# Patient Record
Sex: FEMALE | Race: WHITE | Employment: OTHER | URBAN - METROPOLITAN AREA
[De-identification: names, ages, dates, MRNs, and addresses within clinical notes are randomized per-mention and may not be internally consistent; named-entity substitution may affect disease eponyms.]

---

## 2018-07-24 ENCOUNTER — APPOINTMENT (OUTPATIENT)
Dept: GENERAL RADIOLOGY | Age: 38
End: 2018-07-24
Payer: COMMERCIAL

## 2018-07-24 ENCOUNTER — HOSPITAL ENCOUNTER (EMERGENCY)
Age: 38
Discharge: HOME | End: 2018-07-24
Attending: EMERGENCY MEDICINE
Payer: COMMERCIAL

## 2018-07-24 DIAGNOSIS — R60.9 DEPENDENT EDEMA: ICD-10-CM

## 2018-07-24 DIAGNOSIS — R05.9 COUGH: Primary | ICD-10-CM

## 2018-07-24 LAB
ANION GAP SERPL CALCULATED.3IONS-SCNC: 19 MMOL/L (ref 3–16)
B-TYPE NATRIURETIC PEPTIDE: <15 PG/ML (ref 0–99.9)
BASOPHILS ABSOLUTE: 0.1 K/UL (ref 0–0.2)
BASOPHILS RELATIVE PERCENT: 0.9 %
BUN BLDV-MCNC: 10 MG/DL (ref 7–20)
CALCIUM SERPL-MCNC: 9.3 MG/DL (ref 8.3–10.6)
CHLORIDE BLD-SCNC: 102 MMOL/L (ref 99–110)
CO2: 18 MMOL/L (ref 21–32)
CREAT SERPL-MCNC: 0.6 MG/DL (ref 0.6–1.1)
D DIMER: <200 NG/ML DDU (ref 0–229)
EOSINOPHILS ABSOLUTE: 0.1 K/UL (ref 0–0.6)
EOSINOPHILS RELATIVE PERCENT: 1.5 %
GFR AFRICAN AMERICAN: >60
GFR NON-AFRICAN AMERICAN: >60
GLUCOSE BLD-MCNC: 78 MG/DL (ref 70–99)
HCT VFR BLD CALC: 41.9 % (ref 36–48)
HEMOGLOBIN: 14.3 G/DL (ref 12–16)
LYMPHOCYTES ABSOLUTE: 3 K/UL (ref 1–5.1)
LYMPHOCYTES RELATIVE PERCENT: 31.1 %
MCH RBC QN AUTO: 34.3 PG (ref 26–34)
MCHC RBC AUTO-ENTMCNC: 34.1 G/DL (ref 31–36)
MCV RBC AUTO: 100.4 FL (ref 80–100)
MONOCYTES ABSOLUTE: 0.9 K/UL (ref 0–1.3)
MONOCYTES RELATIVE PERCENT: 9.6 %
NEUTROPHILS ABSOLUTE: 5.5 K/UL (ref 1.7–7.7)
NEUTROPHILS RELATIVE PERCENT: 56.9 %
PDW BLD-RTO: 12.7 % (ref 12.4–15.4)
PERFORMED ON: NORMAL
PLATELET # BLD: 266 K/UL (ref 135–450)
PMV BLD AUTO: 9.9 FL (ref 5–10.5)
POC SAMPLE TYPE: NORMAL
POTASSIUM REFLEX MAGNESIUM: 4.7 MMOL/L (ref 3.5–5.1)
RBC # BLD: 4.18 M/UL (ref 4–5.2)
SODIUM BLD-SCNC: 139 MMOL/L (ref 136–145)
WBC # BLD: 9.6 K/UL (ref 4–11)

## 2018-07-24 PROCEDURE — 71046 X-RAY EXAM CHEST 2 VIEWS: CPT

## 2018-07-24 PROCEDURE — 85379 FIBRIN DEGRADATION QUANT: CPT

## 2018-07-24 PROCEDURE — 80048 BASIC METABOLIC PNL TOTAL CA: CPT

## 2018-07-24 PROCEDURE — 94640 AIRWAY INHALATION TREATMENT: CPT

## 2018-07-24 PROCEDURE — 6370000000 HC RX 637 (ALT 250 FOR IP): Performed by: EMERGENCY MEDICINE

## 2018-07-24 PROCEDURE — 94664 DEMO&/EVAL PT USE INHALER: CPT

## 2018-07-24 PROCEDURE — 99284 EMERGENCY DEPT VISIT MOD MDM: CPT

## 2018-07-24 PROCEDURE — 85025 COMPLETE CBC W/AUTO DIFF WBC: CPT

## 2018-07-24 PROCEDURE — 93005 ELECTROCARDIOGRAM TRACING: CPT | Performed by: EMERGENCY MEDICINE

## 2018-07-24 PROCEDURE — 94761 N-INVAS EAR/PLS OXIMETRY MLT: CPT

## 2018-07-24 PROCEDURE — 83880 ASSAY OF NATRIURETIC PEPTIDE: CPT

## 2018-07-24 RX ORDER — BENZONATATE 100 MG/1
100 CAPSULE ORAL ONCE
Status: COMPLETED | OUTPATIENT
Start: 2018-07-24 | End: 2018-07-24

## 2018-07-24 RX ORDER — ALBUTEROL SULFATE 90 UG/1
2 AEROSOL, METERED RESPIRATORY (INHALATION) ONCE
Status: COMPLETED | OUTPATIENT
Start: 2018-07-24 | End: 2018-07-24

## 2018-07-24 RX ORDER — BENZONATATE 100 MG/1
100 CAPSULE ORAL 3 TIMES DAILY PRN
Qty: 30 CAPSULE | Refills: 0 | Status: SHIPPED | OUTPATIENT
Start: 2018-07-24 | End: 2018-07-31

## 2018-07-24 RX ADMIN — BENZONATATE 100 MG: 100 CAPSULE ORAL at 21:27

## 2018-07-24 RX ADMIN — ALBUTEROL SULFATE 2 PUFF: 90 AEROSOL, METERED RESPIRATORY (INHALATION) at 21:36

## 2018-07-25 VITALS
TEMPERATURE: 98.4 F | DIASTOLIC BLOOD PRESSURE: 80 MMHG | SYSTOLIC BLOOD PRESSURE: 114 MMHG | OXYGEN SATURATION: 100 % | HEART RATE: 99 BPM | RESPIRATION RATE: 20 BRPM

## 2018-07-25 NOTE — ED PROVIDER NOTES
She has no past medical history on file. She has a past surgical history that includes Endometrial ablation. Her family history is not on file. She reports that she has been smoking. She has been smoking about 1.00 pack per day. She has never used smokeless tobacco. She reports that she drinks alcohol. She reports that she does not use drugs. Medications     Previous Medications    AMPHETAMINE-DEXTROAMPHETAMINE (ADDERALL PO)    Take by mouth       Allergies     She has No Known Allergies. Physical Exam     INITIAL VITALS: BP: 115/80, Temp: 98.4 °F (36.9 °C), Pulse: 103, Resp: 18, SpO2: 100 %     General: Slender. Generally very well appearing. Pleasantly conversational, and in NAD. HEENT: Pupils are equal, round, and reactive to light. Extraocular muscles are intact. Conjunctivae are clear and moist. No redness or drainage from the eyes. No drainage from the nose. There is some cobblestoning of the posterior oropharynx, but otherwise oropharyngeal exam is normal.    Neck: Supple, with full range of motion. Cardiovascular: Normal S1-S2 without murmur rub or gallop. 2+ radial pulses bilaterally. 2+ DP pulses bilaterally. Respiratory: Unlabored breathing with equal chest rise and fall. Lungs are clear to auscultation bilaterally. No adventitious lung sounds heard. The patient does have a persistent, near constant, brief, spastic, nonproductive cough. Abdomen: Soft and nontender, without guarding or rebound tenderness. No masses or hepatosplenomegaly. Skin: Warm and dry, without rashes or ecchymoses, lacerations or abrasions. Neuro: Alert and oriented x3. No focal neurologic deficits are noted. Extremities: Warm and well-perfused, without clubbing or cyanosis. The patient moves all extremities equally. There is trace pitting edema at the ankles, symmetric bilaterally, without tenderness to palpation, warmth, or erythema. Psych:  The patient's mood and affect are generally

## 2018-07-25 NOTE — ED NOTES
Patient presents to ED with c/o left leg swelling intermittent. Pt started having a dry cough Saturday.  325 KIMMY Smalls RN  07/24/18 2121

## 2018-08-15 LAB
EKG ATRIAL RATE: 94 BPM
EKG DIAGNOSIS: NORMAL
EKG P AXIS: 53 DEGREES
EKG P-R INTERVAL: 122 MS
EKG Q-T INTERVAL: 354 MS
EKG QRS DURATION: 80 MS
EKG QTC CALCULATION (BAZETT): 442 MS
EKG R AXIS: 68 DEGREES
EKG T AXIS: 69 DEGREES
EKG VENTRICULAR RATE: 94 BPM

## 2021-10-04 ENCOUNTER — APPOINTMENT (OUTPATIENT)
Dept: CT IMAGING | Age: 41
End: 2021-10-04
Payer: COMMERCIAL

## 2021-10-04 ENCOUNTER — HOSPITAL ENCOUNTER (EMERGENCY)
Age: 41
Discharge: HOME | End: 2021-10-04
Attending: EMERGENCY MEDICINE
Payer: COMMERCIAL

## 2021-10-04 VITALS
SYSTOLIC BLOOD PRESSURE: 117 MMHG | WEIGHT: 165 LBS | HEIGHT: 64 IN | HEART RATE: 114 BPM | TEMPERATURE: 98.7 F | RESPIRATION RATE: 16 BRPM | OXYGEN SATURATION: 97 % | DIASTOLIC BLOOD PRESSURE: 65 MMHG | BODY MASS INDEX: 28.17 KG/M2

## 2021-10-04 DIAGNOSIS — N12 PYELONEPHRITIS: Primary | ICD-10-CM

## 2021-10-04 LAB
A/G RATIO: 1.2 (ref 1.1–2.2)
ALBUMIN SERPL-MCNC: 4.1 G/DL (ref 3.4–5)
ALP BLD-CCNC: 113 U/L (ref 40–129)
ALT SERPL-CCNC: 14 U/L (ref 10–40)
ANION GAP SERPL CALCULATED.3IONS-SCNC: 13 MMOL/L (ref 3–16)
AST SERPL-CCNC: 13 U/L (ref 15–37)
BACTERIA: ABNORMAL /HPF
BASOPHILS ABSOLUTE: 0.1 K/UL (ref 0–0.2)
BASOPHILS RELATIVE PERCENT: 0.7 %
BILIRUB SERPL-MCNC: <0.2 MG/DL (ref 0–1)
BILIRUBIN URINE: NEGATIVE
BLOOD, URINE: ABNORMAL
BUN BLDV-MCNC: 9 MG/DL (ref 7–20)
CALCIUM SERPL-MCNC: 8.7 MG/DL (ref 8.3–10.6)
CHLORIDE BLD-SCNC: 102 MMOL/L (ref 99–110)
CLARITY: CLEAR
CO2: 19 MMOL/L (ref 21–32)
COLOR: YELLOW
CREAT SERPL-MCNC: 0.7 MG/DL (ref 0.6–1.1)
EOSINOPHILS ABSOLUTE: 0.1 K/UL (ref 0–0.6)
EOSINOPHILS RELATIVE PERCENT: 0.8 %
EPITHELIAL CELLS, UA: ABNORMAL /HPF (ref 0–5)
GFR AFRICAN AMERICAN: >60
GFR NON-AFRICAN AMERICAN: >60
GLOBULIN: 3.3 G/DL
GLUCOSE BLD-MCNC: 99 MG/DL (ref 70–99)
GLUCOSE URINE: NEGATIVE MG/DL
HCG QUALITATIVE: NEGATIVE
HCG(URINE) PREGNANCY TEST: NEGATIVE
HCT VFR BLD CALC: 42.6 % (ref 36–48)
HEMOGLOBIN: 14.3 G/DL (ref 12–16)
KETONES, URINE: NEGATIVE MG/DL
LEUKOCYTE ESTERASE, URINE: ABNORMAL
LIPASE: 23 U/L (ref 13–60)
LYMPHOCYTES ABSOLUTE: 1 K/UL (ref 1–5.1)
LYMPHOCYTES RELATIVE PERCENT: 6.6 %
MCH RBC QN AUTO: 32.9 PG (ref 26–34)
MCHC RBC AUTO-ENTMCNC: 33.5 G/DL (ref 31–36)
MCV RBC AUTO: 98.1 FL (ref 80–100)
MICROSCOPIC EXAMINATION: YES
MONOCYTES ABSOLUTE: 1.2 K/UL (ref 0–1.3)
MONOCYTES RELATIVE PERCENT: 8.3 %
NEUTROPHILS ABSOLUTE: 12.2 K/UL (ref 1.7–7.7)
NEUTROPHILS RELATIVE PERCENT: 83.6 %
NITRITE, URINE: POSITIVE
PDW BLD-RTO: 12.4 % (ref 12.4–15.4)
PH UA: 6 (ref 5–8)
PLATELET # BLD: 261 K/UL (ref 135–450)
PMV BLD AUTO: 9 FL (ref 5–10.5)
POTASSIUM SERPL-SCNC: 4.3 MMOL/L (ref 3.5–5.1)
PROTEIN UA: 100 MG/DL
RBC # BLD: 4.34 M/UL (ref 4–5.2)
RBC UA: ABNORMAL /HPF (ref 0–4)
SODIUM BLD-SCNC: 134 MMOL/L (ref 136–145)
SPECIFIC GRAVITY UA: 1.02 (ref 1–1.03)
TOTAL PROTEIN: 7.4 G/DL (ref 6.4–8.2)
URINE REFLEX TO CULTURE: YES
URINE TYPE: ABNORMAL
UROBILINOGEN, URINE: 0.2 E.U./DL
WBC # BLD: 14.5 K/UL (ref 4–11)
WBC UA: ABNORMAL /HPF (ref 0–5)

## 2021-10-04 PROCEDURE — 96365 THER/PROPH/DIAG IV INF INIT: CPT

## 2021-10-04 PROCEDURE — 84703 CHORIONIC GONADOTROPIN ASSAY: CPT

## 2021-10-04 PROCEDURE — 99283 EMERGENCY DEPT VISIT LOW MDM: CPT

## 2021-10-04 PROCEDURE — 2580000003 HC RX 258: Performed by: PHYSICIAN ASSISTANT

## 2021-10-04 PROCEDURE — 83690 ASSAY OF LIPASE: CPT

## 2021-10-04 PROCEDURE — 80053 COMPREHEN METABOLIC PANEL: CPT

## 2021-10-04 PROCEDURE — 6360000002 HC RX W HCPCS: Performed by: PHYSICIAN ASSISTANT

## 2021-10-04 PROCEDURE — 36415 COLL VENOUS BLD VENIPUNCTURE: CPT

## 2021-10-04 PROCEDURE — 85025 COMPLETE CBC W/AUTO DIFF WBC: CPT

## 2021-10-04 PROCEDURE — 87077 CULTURE AEROBIC IDENTIFY: CPT

## 2021-10-04 PROCEDURE — 87186 SC STD MICRODIL/AGAR DIL: CPT

## 2021-10-04 PROCEDURE — 87086 URINE CULTURE/COLONY COUNT: CPT

## 2021-10-04 PROCEDURE — 6360000004 HC RX CONTRAST MEDICATION: Performed by: PHYSICIAN ASSISTANT

## 2021-10-04 PROCEDURE — 81001 URINALYSIS AUTO W/SCOPE: CPT

## 2021-10-04 PROCEDURE — 74177 CT ABD & PELVIS W/CONTRAST: CPT

## 2021-10-04 PROCEDURE — 96375 TX/PRO/DX INJ NEW DRUG ADDON: CPT

## 2021-10-04 RX ORDER — TRAZODONE HYDROCHLORIDE 100 MG/1
100 TABLET ORAL NIGHTLY
COMMUNITY
Start: 2021-08-21

## 2021-10-04 RX ORDER — LEVOTHYROXINE SODIUM 0.1 MG/1
TABLET ORAL
COMMUNITY
Start: 2021-02-25

## 2021-10-04 RX ORDER — KETOROLAC TROMETHAMINE 30 MG/ML
15 INJECTION, SOLUTION INTRAMUSCULAR; INTRAVENOUS ONCE
Status: COMPLETED | OUTPATIENT
Start: 2021-10-04 | End: 2021-10-04

## 2021-10-04 RX ORDER — CEFDINIR 300 MG/1
300 CAPSULE ORAL 2 TIMES DAILY
Qty: 28 CAPSULE | Refills: 0 | Status: SHIPPED | OUTPATIENT
Start: 2021-10-04 | End: 2021-10-18

## 2021-10-04 RX ORDER — 0.9 % SODIUM CHLORIDE 0.9 %
1000 INTRAVENOUS SOLUTION INTRAVENOUS ONCE
Status: COMPLETED | OUTPATIENT
Start: 2021-10-04 | End: 2021-10-04

## 2021-10-04 RX ORDER — BUPROPION HYDROCHLORIDE 150 MG/1
150 TABLET ORAL DAILY
Status: ON HOLD | COMMUNITY
End: 2022-06-06 | Stop reason: HOSPADM

## 2021-10-04 RX ADMIN — SODIUM CHLORIDE 1000 ML: 9 INJECTION, SOLUTION INTRAVENOUS at 13:52

## 2021-10-04 RX ADMIN — KETOROLAC TROMETHAMINE 15 MG: 30 INJECTION, SOLUTION INTRAMUSCULAR at 13:55

## 2021-10-04 RX ADMIN — CEFTRIAXONE 1000 MG: 1 INJECTION, POWDER, FOR SOLUTION INTRAMUSCULAR; INTRAVENOUS at 13:58

## 2021-10-04 RX ADMIN — IOPAMIDOL 80 ML: 755 INJECTION, SOLUTION INTRAVENOUS at 14:11

## 2021-10-04 ASSESSMENT — ENCOUNTER SYMPTOMS
CHEST TIGHTNESS: 0
DIARRHEA: 0
ABDOMINAL PAIN: 1
NAUSEA: 0
CONSTIPATION: 0
SHORTNESS OF BREATH: 0
VOMITING: 0

## 2021-10-04 ASSESSMENT — PAIN DESCRIPTION - PAIN TYPE: TYPE: ACUTE PAIN

## 2021-10-04 ASSESSMENT — PAIN SCALES - GENERAL
PAINLEVEL_OUTOF10: 8
PAINLEVEL_OUTOF10: 8

## 2021-10-04 ASSESSMENT — PAIN DESCRIPTION - LOCATION: LOCATION: ABDOMEN;FLANK

## 2021-10-04 ASSESSMENT — PAIN DESCRIPTION - ORIENTATION: ORIENTATION: RIGHT

## 2021-10-04 NOTE — ED PROVIDER NOTES
[address] ENCOUNTER          PHYSICIAN ASSISTANT NOTE       Date of evaluation: 10/4/2021    Chief Complaint     Flank Pain and Abdominal Cramping      History of Present Illness     Carlton Moralez is a 37 y.o. female presenting to the emergency department for evaluation of abdominal discomfort with dysuria, increased urinary frequency and flank pain. Symptoms started last Tuesday with increased urinary frequency, this has been persistent over the last week. On Saturday she developed suprapubic abdominal discomfort, and states that after urinating she noticed many small granules in the toilet. Today she developed pain with urination as well as bilateral flank pain. She has felt chilled, but does not believe that she has had a fever. No associated nausea or vomiting. She has never had symptoms like this in the past.  She did take over-the-counter Azo on Tuesday with no improvement in symptoms. Review of Systems     Review of Systems   Constitutional: Positive for chills. Negative for diaphoresis, fatigue and fever. Respiratory: Negative for chest tightness and shortness of breath. Cardiovascular: Negative for chest pain, palpitations and leg swelling. Gastrointestinal: Positive for abdominal pain. Negative for constipation, diarrhea, nausea and vomiting. Genitourinary: Positive for dysuria, flank pain and frequency. Negative for hematuria and urgency. Musculoskeletal: Negative for myalgias. Neurological: Negative for weakness and headaches. Psychiatric/Behavioral: Negative for confusion. Past Medical, Surgical, Family, and Social History     She has a past medical history of Depression and Thyroid disease. She has a past surgical history that includes Endometrial ablation. Her family history is not on file. She reports that she has been smoking. She has been smoking about 1.00 pack per day.  She has never used smokeless tobacco. She reports current 3.4 cm left adnexal cyst.      No evidence of acute abnormality identified.            LABS:   Results for orders placed or performed during the hospital encounter of 10/04/21   CBC Auto Differential   Result Value Ref Range    WBC 14.5 (H) 4.0 - 11.0 K/uL    RBC 4.34 4.00 - 5.20 M/uL    Hemoglobin 14.3 12.0 - 16.0 g/dL    Hematocrit 42.6 36.0 - 48.0 %    MCV 98.1 80.0 - 100.0 fL    MCH 32.9 26.0 - 34.0 pg    MCHC 33.5 31.0 - 36.0 g/dL    RDW 12.4 12.4 - 15.4 %    Platelets 341 598 - 041 K/uL    MPV 9.0 5.0 - 10.5 fL    Neutrophils % 83.6 %    Lymphocytes % 6.6 %    Monocytes % 8.3 %    Eosinophils % 0.8 %    Basophils % 0.7 %    Neutrophils Absolute 12.2 (H) 1.7 - 7.7 K/uL    Lymphocytes Absolute 1.0 1.0 - 5.1 K/uL    Monocytes Absolute 1.2 0.0 - 1.3 K/uL    Eosinophils Absolute 0.1 0.0 - 0.6 K/uL    Basophils Absolute 0.1 0.0 - 0.2 K/uL   Comprehensive Metabolic Panel   Result Value Ref Range    Sodium 134 (L) 136 - 145 mmol/L    Potassium 4.3 3.5 - 5.1 mmol/L    Chloride 102 99 - 110 mmol/L    CO2 19 (L) 21 - 32 mmol/L    Anion Gap 13 3 - 16    Glucose 99 70 - 99 mg/dL    BUN 9 7 - 20 mg/dL    CREATININE 0.7 0.6 - 1.1 mg/dL    GFR Non-African American >60 >60    GFR African American >60 >60    Calcium 8.7 8.3 - 10.6 mg/dL    Total Protein 7.4 6.4 - 8.2 g/dL    Albumin 4.1 3.4 - 5.0 g/dL    Albumin/Globulin Ratio 1.2 1.1 - 2.2    Total Bilirubin <0.2 0.0 - 1.0 mg/dL    Alkaline Phosphatase 113 40 - 129 U/L    ALT 14 10 - 40 U/L    AST 13 (L) 15 - 37 U/L    Globulin 3.3 g/dL   HCG Qualitative, Serum   Result Value Ref Range    hCG Qual Negative Detects HCG level >10 MIU/mL   Lipase   Result Value Ref Range    Lipase 23.0 13.0 - 60.0 U/L   Urinalysis Reflex to Culture    Specimen: Urine, clean catch   Result Value Ref Range    Color, UA Yellow Straw/Yellow    Clarity, UA Clear Clear    Glucose, Ur Negative Negative mg/dL    Bilirubin Urine Negative Negative    Ketones, Urine Negative Negative mg/dL    Specific Gravity, UA 1.025 1.005 - 1.030    Blood, Urine LARGE (A) Negative    pH, UA 6.0 5.0 - 8.0    Protein,  (A) Negative mg/dL    Urobilinogen, Urine 0.2 <2.0 E.U./dL    Nitrite, Urine POSITIVE (A) Negative    Leukocyte Esterase, Urine LARGE (A) Negative    Microscopic Examination YES     Urine Type NotGiven     Urine Reflex to Culture Yes    Pregnancy, Urine   Result Value Ref Range    HCG(Urine) Pregnancy Test Negative Detects HCG level >20 MIU/mL   Microscopic Urinalysis   Result Value Ref Range    WBC, UA 21-50 (A) 0 - 5 /HPF    RBC, UA 21-50 (A) 0 - 4 /HPF    Epithelial Cells, UA 0-1 0 - 5 /HPF    Bacteria, UA 4+ (A) None Seen /HPF       ED BEDSIDE ULTRASOUND:  None    RECENT VITALS:  BP: 117/65, Temp: 98.7 °F (37.1 °C), Pulse: 114, Resp: 16, SpO2: 97 %     Procedures   None    ED Course     Nursing Notes, Past Medical Hx,Past Surgical Hx, Social Hx, Allergies, and Family Hx were reviewed. The patient was given the following medications:  Orders Placed This Encounter   Medications    0.9 % sodium chloride bolus    ketorolac (TORADOL) injection 15 mg    cefTRIAXone (ROCEPHIN) 1000 mg IVPB in 50 mL D5W minibag     Order Specific Question:   Antimicrobial Indications     Answer:   Urinary Tract Infection    iopamidol (ISOVUE-370) 76 % injection 80 mL    cefdinir (OMNICEF) 300 MG capsule     Sig: Take 1 capsule by mouth 2 times daily for 14 days     Dispense:  28 capsule     Refill:  0       CONSULTS:  None    MEDICAL DECISION MAKING / ASSESSMENT / Yosvany Zafar is a 37 y.o. female presenting to the emergency department for evaluation of dysuria, increased urinary frequency, suprapubic abdominal pain with bilateral flank pain. Symptoms were concerning for urinary tract infection versus pyelonephritis although she did report passing small gravel/granules yesterday with her urine.   No history of nephrolithiasis in the past.  Upon presentation she was normotensive, tachycardic, afebrile, seated on the stretcher in no acute distress. She had mild suprapubic abdominal tenderness with no rebound or guarding as well as bilateral CVA tenderness. Urinalysis was thickened for large leukocytes as well as positive for nitrites. She was given Rocephin in the ED and CT abdomen and pelvis with IV contrast was ordered. Imaging study showed incidental adnexal cyst which was conveyed to the patient. Will treat for pyelonephritis with a 14-day course of Omnicef. Urine culture was sent and results are pending. As she is able to tolerate p.o., and does not require any significant medication for pain control we felt that she could be managed at home with strict return precautions and follow-up instructions. This patient was also evaluated by the attending physician. All care plans were discussed and agreed upon. Clinical Impression     1. Pyelonephritis      Disposition     PATIENT REFERRED TO:  No follow-up provider specified.     DISCHARGE MEDICATIONS:  New Prescriptions    CEFDINIR (OMNICEF) 300 MG CAPSULE    Take 1 capsule by mouth 2 times daily for 14 days       DISPOSITION Decision To Discharge 10/04/2021 02:55:21 PM        Letty Whalen PA-C  10/04/21 1509

## 2021-10-04 NOTE — ED NOTES
Pt has d/c order. D/C instructions given. Prescriptions given. Pt verbalized understanding. Pt out to alisha.          Marianela Chilel RN  10/04/21 [id number]

## 2021-10-04 NOTE — ED PROVIDER NOTES
ED Attending Attestation Note     Date of evaluation: 10/4/2021    This patient was seen by the advance practice provider. I have seen and examined the patient, agree with the workup, evaluation, management and diagnosis. The care plan has been discussed. My assessment reveals 71-year-old female presenting to the emerged part with complaint of urinary frequency. On examination some mild right flank tenderness.      Andres Jay MD  10/04/21 7973

## 2021-10-06 LAB
ORGANISM: ABNORMAL
URINE CULTURE, ROUTINE: ABNORMAL

## 2022-06-04 ENCOUNTER — HOSPITAL ENCOUNTER (INPATIENT)
Age: 42
LOS: 2 days | Discharge: HOME | DRG: 885 | End: 2022-06-06
Attending: EMERGENCY MEDICINE
Payer: COMMERCIAL

## 2022-06-04 DIAGNOSIS — F32.A DEPRESSION, UNSPECIFIED DEPRESSION TYPE: Primary | ICD-10-CM

## 2022-06-04 DIAGNOSIS — R45.851 SUICIDE IDEATION: ICD-10-CM

## 2022-06-04 DIAGNOSIS — F90.0 ADHD (ATTENTION DEFICIT HYPERACTIVITY DISORDER), INATTENTIVE TYPE: ICD-10-CM

## 2022-06-04 PROBLEM — F33.9 MDD (MAJOR DEPRESSIVE DISORDER), RECURRENT EPISODE (HCC): Status: ACTIVE | Noted: 2022-06-04

## 2022-06-04 LAB
A/G RATIO: 1.8 (ref 1.1–2.2)
ACETAMINOPHEN LEVEL: <5 UG/ML (ref 10–30)
ALBUMIN SERPL-MCNC: 4.4 G/DL (ref 3.4–5)
ALP BLD-CCNC: 84 U/L (ref 40–129)
ALT SERPL-CCNC: 18 U/L (ref 10–40)
AMPHETAMINE SCREEN, URINE: POSITIVE
ANION GAP SERPL CALCULATED.3IONS-SCNC: 13 MMOL/L (ref 3–16)
AST SERPL-CCNC: 15 U/L (ref 15–37)
BARBITURATE SCREEN URINE: ABNORMAL
BASOPHILS ABSOLUTE: 0.1 K/UL (ref 0–0.2)
BASOPHILS RELATIVE PERCENT: 1.1 %
BENZODIAZEPINE SCREEN, URINE: ABNORMAL
BILIRUB SERPL-MCNC: 0.3 MG/DL (ref 0–1)
BUN BLDV-MCNC: 8 MG/DL (ref 7–20)
CALCIUM SERPL-MCNC: 8.8 MG/DL (ref 8.3–10.6)
CANNABINOID SCREEN URINE: ABNORMAL
CHLORIDE BLD-SCNC: 106 MMOL/L (ref 99–110)
CO2: 21 MMOL/L (ref 21–32)
COCAINE METABOLITE SCREEN URINE: ABNORMAL
CREAT SERPL-MCNC: 0.8 MG/DL (ref 0.6–1.1)
EOSINOPHILS ABSOLUTE: 0.1 K/UL (ref 0–0.6)
EOSINOPHILS RELATIVE PERCENT: 1.1 %
ETHANOL: 63 MG/DL (ref 0–0.08)
GFR AFRICAN AMERICAN: >60
GFR NON-AFRICAN AMERICAN: >60
GLUCOSE BLD-MCNC: 84 MG/DL (ref 70–99)
HCT VFR BLD CALC: 38.8 % (ref 36–48)
HEMOGLOBIN: 13.4 G/DL (ref 12–16)
INFLUENZA A: NOT DETECTED
INFLUENZA B: NOT DETECTED
LYMPHOCYTES ABSOLUTE: 1.5 K/UL (ref 1–5.1)
LYMPHOCYTES RELATIVE PERCENT: 24.2 %
Lab: ABNORMAL
MCH RBC QN AUTO: 33 PG (ref 26–34)
MCHC RBC AUTO-ENTMCNC: 34.5 G/DL (ref 31–36)
MCV RBC AUTO: 95.4 FL (ref 80–100)
METHADONE SCREEN, URINE: ABNORMAL
MONOCYTES ABSOLUTE: 0.6 K/UL (ref 0–1.3)
MONOCYTES RELATIVE PERCENT: 10.1 %
NEUTROPHILS ABSOLUTE: 3.9 K/UL (ref 1.7–7.7)
NEUTROPHILS RELATIVE PERCENT: 63.5 %
OPIATE SCREEN URINE: ABNORMAL
OXYCODONE URINE: ABNORMAL
PDW BLD-RTO: 12.3 % (ref 12.4–15.4)
PH UA: 6
PHENCYCLIDINE SCREEN URINE: ABNORMAL
PLATELET # BLD: 238 K/UL (ref 135–450)
PMV BLD AUTO: 7.9 FL (ref 5–10.5)
POTASSIUM SERPL-SCNC: 3.6 MMOL/L (ref 3.5–5.1)
PROPOXYPHENE SCREEN: ABNORMAL
RBC # BLD: 4.07 M/UL (ref 4–5.2)
SALICYLATE, SERUM: <0.3 MG/DL (ref 15–30)
SARS-COV-2 RNA, RT PCR: NOT DETECTED
SODIUM BLD-SCNC: 140 MMOL/L (ref 136–145)
TOTAL PROTEIN: 6.8 G/DL (ref 6.4–8.2)
TSH SERPL DL<=0.05 MIU/L-ACNC: 2.31 UIU/ML (ref 0.27–4.2)
WBC # BLD: 6.2 K/UL (ref 4–11)

## 2022-06-04 PROCEDURE — 80143 DRUG ASSAY ACETAMINOPHEN: CPT

## 2022-06-04 PROCEDURE — 85025 COMPLETE CBC W/AUTO DIFF WBC: CPT

## 2022-06-04 PROCEDURE — 99285 EMERGENCY DEPT VISIT HI MDM: CPT

## 2022-06-04 PROCEDURE — 82077 ASSAY SPEC XCP UR&BREATH IA: CPT

## 2022-06-04 PROCEDURE — 80061 LIPID PANEL: CPT

## 2022-06-04 PROCEDURE — 80179 DRUG ASSAY SALICYLATE: CPT

## 2022-06-04 PROCEDURE — 99221 1ST HOSP IP/OBS SF/LOW 40: CPT | Performed by: NURSE PRACTITIONER

## 2022-06-04 PROCEDURE — 36415 COLL VENOUS BLD VENIPUNCTURE: CPT

## 2022-06-04 PROCEDURE — 6370000000 HC RX 637 (ALT 250 FOR IP)

## 2022-06-04 PROCEDURE — 80307 DRUG TEST PRSMV CHEM ANLYZR: CPT

## 2022-06-04 PROCEDURE — 99223 1ST HOSP IP/OBS HIGH 75: CPT

## 2022-06-04 PROCEDURE — 80053 COMPREHEN METABOLIC PANEL: CPT

## 2022-06-04 PROCEDURE — 83036 HEMOGLOBIN GLYCOSYLATED A1C: CPT

## 2022-06-04 PROCEDURE — 84443 ASSAY THYROID STIM HORMONE: CPT

## 2022-06-04 PROCEDURE — 1240000000 HC EMOTIONAL WELLNESS R&B

## 2022-06-04 PROCEDURE — 87636 SARSCOV2 & INF A&B AMP PRB: CPT

## 2022-06-04 RX ORDER — DEXTROAMPHETAMINE SACCHARATE, AMPHETAMINE ASPARTATE, DEXTROAMPHETAMINE SULFATE AND AMPHETAMINE SULFATE 2.5; 2.5; 2.5; 2.5 MG/1; MG/1; MG/1; MG/1
20 TABLET ORAL
Status: DISCONTINUED | OUTPATIENT
Start: 2022-06-04 | End: 2022-06-06

## 2022-06-04 RX ORDER — HYDROXYZINE 50 MG/1
50 TABLET, FILM COATED ORAL 3 TIMES DAILY PRN
Status: DISCONTINUED | OUTPATIENT
Start: 2022-06-04 | End: 2022-06-06 | Stop reason: HOSPADM

## 2022-06-04 RX ORDER — IBUPROFEN 400 MG/1
400 TABLET ORAL EVERY 6 HOURS PRN
Status: DISCONTINUED | OUTPATIENT
Start: 2022-06-04 | End: 2022-06-06 | Stop reason: HOSPADM

## 2022-06-04 RX ORDER — LEVOTHYROXINE SODIUM 0.1 MG/1
100 TABLET ORAL DAILY
Status: DISCONTINUED | OUTPATIENT
Start: 2022-06-04 | End: 2022-06-06 | Stop reason: HOSPADM

## 2022-06-04 RX ORDER — BUPROPION HYDROCHLORIDE 300 MG/1
300 TABLET ORAL DAILY
Status: DISCONTINUED | OUTPATIENT
Start: 2022-06-05 | End: 2022-06-06 | Stop reason: HOSPADM

## 2022-06-04 RX ORDER — TRAZODONE HYDROCHLORIDE 50 MG/1
50 TABLET ORAL NIGHTLY PRN
Status: DISCONTINUED | OUTPATIENT
Start: 2022-06-04 | End: 2022-06-04

## 2022-06-04 RX ORDER — POLYETHYLENE GLYCOL 3350 17 G
2 POWDER IN PACKET (EA) ORAL
Status: DISCONTINUED | OUTPATIENT
Start: 2022-06-04 | End: 2022-06-06 | Stop reason: HOSPADM

## 2022-06-04 RX ORDER — DIPHENHYDRAMINE HYDROCHLORIDE 50 MG/ML
50 INJECTION INTRAMUSCULAR; INTRAVENOUS EVERY 4 HOURS PRN
Status: DISCONTINUED | OUTPATIENT
Start: 2022-06-04 | End: 2022-06-06 | Stop reason: HOSPADM

## 2022-06-04 RX ORDER — DEXTROAMPHETAMINE SACCHARATE, AMPHETAMINE ASPARTATE, DEXTROAMPHETAMINE SULFATE AND AMPHETAMINE SULFATE 2.5; 2.5; 2.5; 2.5 MG/1; MG/1; MG/1; MG/1
10 TABLET ORAL EVERY EVENING
Status: DISCONTINUED | OUTPATIENT
Start: 2022-06-04 | End: 2022-06-06

## 2022-06-04 RX ORDER — MAGNESIUM HYDROXIDE/ALUMINUM HYDROXICE/SIMETHICONE 120; 1200; 1200 MG/30ML; MG/30ML; MG/30ML
30 SUSPENSION ORAL EVERY 6 HOURS PRN
Status: DISCONTINUED | OUTPATIENT
Start: 2022-06-04 | End: 2022-06-06 | Stop reason: HOSPADM

## 2022-06-04 RX ORDER — TRAZODONE HYDROCHLORIDE 100 MG/1
100 TABLET ORAL NIGHTLY
Status: DISCONTINUED | OUTPATIENT
Start: 2022-06-04 | End: 2022-06-06 | Stop reason: HOSPADM

## 2022-06-04 RX ORDER — ACETAMINOPHEN 325 MG/1
650 TABLET ORAL EVERY 4 HOURS PRN
Status: DISCONTINUED | OUTPATIENT
Start: 2022-06-04 | End: 2022-06-06 | Stop reason: HOSPADM

## 2022-06-04 RX ORDER — BUPROPION HYDROCHLORIDE 150 MG/1
150 TABLET ORAL DAILY
Status: DISCONTINUED | OUTPATIENT
Start: 2022-06-04 | End: 2022-06-04

## 2022-06-04 RX ORDER — OLANZAPINE 10 MG/1
10 TABLET ORAL EVERY 8 HOURS PRN
Status: DISCONTINUED | OUTPATIENT
Start: 2022-06-04 | End: 2022-06-06 | Stop reason: HOSPADM

## 2022-06-04 RX ORDER — HYDROXYZINE HYDROCHLORIDE 25 MG/1
25 TABLET, FILM COATED ORAL NIGHTLY
COMMUNITY

## 2022-06-04 RX ORDER — MELOXICAM 15 MG/1
15 TABLET ORAL DAILY
COMMUNITY
Start: 2022-06-02

## 2022-06-04 RX ORDER — MELOXICAM 15 MG/1
15 TABLET ORAL DAILY
Status: DISCONTINUED | OUTPATIENT
Start: 2022-06-04 | End: 2022-06-06 | Stop reason: HOSPADM

## 2022-06-04 RX ADMIN — BUPROPION HYDROCHLORIDE 150 MG: 150 TABLET, EXTENDED RELEASE ORAL at 10:57

## 2022-06-04 RX ADMIN — HYDROXYZINE HYDROCHLORIDE 50 MG: 50 TABLET, FILM COATED ORAL at 21:15

## 2022-06-04 RX ADMIN — TRAZODONE HYDROCHLORIDE 100 MG: 100 TABLET ORAL at 21:12

## 2022-06-04 RX ADMIN — LEVOTHYROXINE SODIUM 100 MCG: 100 TABLET ORAL at 06:19

## 2022-06-04 ASSESSMENT — SLEEP AND FATIGUE QUESTIONNAIRES
DO YOU USE A SLEEP AID: YES
SLEEP PATTERN: RESTLESSNESS
AVERAGE NUMBER OF SLEEP HOURS: 4
SLEEP PATTERN: DIFFICULTY FALLING ASLEEP;DIFFICULTY ARISING
AVERAGE NUMBER OF SLEEP HOURS: 6
DO YOU HAVE DIFFICULTY SLEEPING: YES
DO YOU HAVE DIFFICULTY SLEEPING: YES
DO YOU USE A SLEEP AID: YES

## 2022-06-04 ASSESSMENT — PATIENT HEALTH QUESTIONNAIRE - PHQ9
SUM OF ALL RESPONSES TO PHQ QUESTIONS 1-9: 15
SUM OF ALL RESPONSES TO PHQ QUESTIONS 1-9: 20

## 2022-06-04 ASSESSMENT — ENCOUNTER SYMPTOMS
SHORTNESS OF BREATH: 0
ABDOMINAL PAIN: 0
COUGH: 0

## 2022-06-04 ASSESSMENT — PAIN - FUNCTIONAL ASSESSMENT: PAIN_FUNCTIONAL_ASSESSMENT: NONE - DENIES PAIN

## 2022-06-04 ASSESSMENT — LIFESTYLE VARIABLES
HOW OFTEN DO YOU HAVE A DRINK CONTAINING ALCOHOL: 2-4 TIMES A MONTH
HOW MANY STANDARD DRINKS CONTAINING ALCOHOL DO YOU HAVE ON A TYPICAL DAY: 3 OR 4

## 2022-06-04 ASSESSMENT — PAIN SCALES - GENERAL: PAINLEVEL_OUTOF10: 6

## 2022-06-04 ASSESSMENT — PAIN DESCRIPTION - PAIN TYPE: TYPE: ACUTE PAIN

## 2022-06-04 ASSESSMENT — PAIN DESCRIPTION - LOCATION: LOCATION: HEAD

## 2022-06-04 ASSESSMENT — PAIN DESCRIPTION - DESCRIPTORS: DESCRIPTORS: PRESSURE

## 2022-06-04 ASSESSMENT — PAIN DESCRIPTION - FREQUENCY: FREQUENCY: CONTINUOUS

## 2022-06-04 NOTE — ED PROVIDER NOTES
Emergency Department Provider Note  Location: [place] [address] ED  6/3/2022     Patient Identification  Adam Dan is a 40 y.o. female    Chief Complaint  Psychiatric Evaluation (pt she has had increased S/I worse tonight d/t possible divorce. denies drugs/alcohol )      HPI  (History provided by patient)  This is a 40 y.o. female with a PMH significant for depression, thyroid disease presented today for depression, SI. Patient is having increasing depression and suicidal ideation due to her marriage falling apart. She states they are facing the possibility of divorce. She denies use of drug and alcohol. She also denies access to weapons. She cannot tell me any specific plans. She also reports no medical concerns. No recent illnesses. She has right leg numbness and tingling that been ongoing for a few months and followed by PCP. Otherwise no other medical issues. ROS  Review of Systems   Constitutional: Negative for fever. HENT: Negative for congestion. Eyes: Negative for visual disturbance. Respiratory: Negative for cough and shortness of breath. Cardiovascular: Negative for chest pain. Gastrointestinal: Negative for abdominal pain. Genitourinary: Negative for dysuria and frequency. Musculoskeletal: Negative for myalgias. Skin: Negative for rash. Neurological: Negative for light-headedness and headaches. Psychiatric/Behavioral: Positive for dysphoric mood and suicidal ideas. I have reviewed the following nursing documentation:  Allergies: No Known Allergies    Past medical history:  has a past medical history of Depression and Thyroid disease. Past surgical history:  has a past surgical history that includes Endometrial ablation. Home medications:   Prior to Admission medications    Medication Sig Start Date End Date Taking?  Authorizing Provider   traZODone (DESYREL) 100 MG tablet Take 100 mg by mouth nightly 8/21/21   Historical Provider, MD buPROPion (WELLBUTRIN XL) 150 MG extended release tablet Take 150 mg by mouth daily    Historical Provider, MD   levothyroxine (SYNTHROID) 100 MCG tablet TAKE 1 TABLET BY MOUTH EVERY DAY 2/25/21   Historical Provider, MD   Amphetamine-Dextroamphetamine (ADDERALL PO) Take by mouth    Historical Provider, MD       Social history:  reports that she has been smoking. She has been smoking about 1.00 pack per day. She has never used smokeless tobacco. She reports current alcohol use. She reports that she does not use drugs. Family history:  History reviewed. No pertinent family history. Exam  ED Triage Vitals [06/04/22 0110]   BP Temp Temp Source Heart Rate Resp SpO2 Height Weight   106/73 97.5 °F (36.4 °C) Oral 98 18 98 % 5' 7\" (1.702 m) 170 lb (77.1 kg)   Physical Exam  Vitals and nursing note reviewed. Constitutional:       General: She is not in acute distress. Appearance: Normal appearance. She is well-developed. She is not diaphoretic. HENT:      Head: Normocephalic and atraumatic. Eyes:      General: Lids are normal. No scleral icterus. Right eye: No discharge. Left eye: No discharge. Conjunctiva/sclera: Conjunctivae normal.   Neck:      Trachea: No tracheal deviation. Cardiovascular:      Rate and Rhythm: Normal rate and regular rhythm. Pulses: Normal pulses. Heart sounds: Normal heart sounds. No murmur heard. Pulmonary:      Effort: Pulmonary effort is normal. No respiratory distress. Breath sounds: Normal breath sounds. No stridor. No wheezing. Abdominal:      General: There is no distension. Palpations: Abdomen is soft. Tenderness: There is no abdominal tenderness. There is no guarding or rebound. Musculoskeletal:         General: No deformity. Cervical back: Neck supple. Right lower leg: No edema. Left lower leg: No edema. Skin:     General: Skin is warm and dry.       Capillary Refill: Capillary refill takes less than 2 seconds. Coloration: Skin is not pale. Findings: No rash. Neurological:      Mental Status: She is alert and oriented to person, place, and time. Cranial Nerves: No dysarthria or facial asymmetry. Motor: Motor function is intact. No tremor or abnormal muscle tone. Psychiatric:         Attention and Perception: Attention normal.         Mood and Affect: Mood is depressed. Affect is flat. Speech: Speech normal.         Behavior: Behavior is cooperative.            MDM/ED Course  Labs  Results for orders placed or performed during the hospital encounter of 06/04/22   COVID-19 & Influenza Combo    Specimen: Nasopharyngeal Swab   Result Value Ref Range    SARS-CoV-2 RNA, RT PCR NOT DETECTED NOT DETECTED    INFLUENZA A NOT DETECTED NOT DETECTED    INFLUENZA B NOT DETECTED NOT DETECTED   CBC with Auto Differential   Result Value Ref Range    WBC 6.2 4.0 - 11.0 K/uL    RBC 4.07 4.00 - 5.20 M/uL    Hemoglobin 13.4 12.0 - 16.0 g/dL    Hematocrit 38.8 36.0 - 48.0 %    MCV 95.4 80.0 - 100.0 fL    MCH 33.0 26.0 - 34.0 pg    MCHC 34.5 31.0 - 36.0 g/dL    RDW 12.3 (L) 12.4 - 15.4 %    Platelets 618 539 - 315 K/uL    MPV 7.9 5.0 - 10.5 fL    Neutrophils % 63.5 %    Lymphocytes % 24.2 %    Monocytes % 10.1 %    Eosinophils % 1.1 %    Basophils % 1.1 %    Neutrophils Absolute 3.9 1.7 - 7.7 K/uL    Lymphocytes Absolute 1.5 1.0 - 5.1 K/uL    Monocytes Absolute 0.6 0.0 - 1.3 K/uL    Eosinophils Absolute 0.1 0.0 - 0.6 K/uL    Basophils Absolute 0.1 0.0 - 0.2 K/uL   Comprehensive Metabolic Panel   Result Value Ref Range    Sodium 140 136 - 145 mmol/L    Potassium 3.6 3.5 - 5.1 mmol/L    Chloride 106 99 - 110 mmol/L    CO2 21 21 - 32 mmol/L    Anion Gap 13 3 - 16    Glucose 84 70 - 99 mg/dL    BUN 8 7 - 20 mg/dL    CREATININE 0.8 0.6 - 1.1 mg/dL    GFR Non-African American >60 >60    GFR African American >60 >60    Calcium 8.8 8.3 - 10.6 mg/dL    Total Protein 6.8 6.4 - 8.2 g/dL    Albumin 4.4 3.4 - 5.0 g/dL Albumin/Globulin Ratio 1.8 1.1 - 2.2    Total Bilirubin 0.3 0.0 - 1.0 mg/dL    Alkaline Phosphatase 84 40 - 129 U/L    ALT 18 10 - 40 U/L    AST 15 15 - 37 U/L   Ethanol   Result Value Ref Range    Ethanol Lvl 63 mg/dL   Acetaminophen Level   Result Value Ref Range    Acetaminophen Level <5 (L) 10 - 30 ug/mL   Salicylate   Result Value Ref Range    Salicylate, Serum <7.0 (L) 15.0 - 30.0 mg/dL   Urine Drug Screen   Result Value Ref Range    Amphetamine Screen, Urine POSITIVE (A) Negative <1000ng/mL    Barbiturate Screen, Ur Neg Negative <200 ng/mL    Benzodiazepine Screen, Urine Neg Negative <200 ng/mL    Cannabinoid Scrn, Ur Neg Negative <50 ng/mL    Cocaine Metabolite Screen, Urine Neg Negative <300 ng/mL    Opiate Scrn, Ur Neg Negative <300 ng/mL    PCP Screen, Urine Neg Negative <25 ng/mL    Methadone Screen, Urine Neg Negative <300 ng/mL    Propoxyphene Scrn, Ur Neg Negative <300 ng/mL    Oxycodone Urine Neg Negative <100 ng/ml    pH, UA 6.0     Drug Screen Comment: see below          - Patient seen and evaluated in room B1.  40 y.o. female presented for depression, SI. No medical complaints. - Diagnostic studies reviewed. Lab did not show clinically significant pathology. - patient is medically cleared. Psych evaluated the patient and decided to admit the patient. I have considered and evaluated Carlene Travis for the following diagnoses:  METABOLIC DERANGEMENT, TOXIC INGESTION, IMMEDIATE RISKS OF SUICIDE/HOMICIDE, DECOMPENSATED PSYCHOSIS OR PSYCHIATRIC DISORDER     Clinical Impression:  1. Depression, unspecified depression type    2. Suicide ideation        Disposition:  Admit to psychiatry in stable condition. Blood pressure (!) 91/58, pulse 85, temperature 97.7 °F (36.5 °C), temperature source Temporal, resp. rate 16, height 5' 7\" (1.702 m), weight 170 lb (77.1 kg), SpO2 99 %.        This chart was generated in part by using Dragon Dictation system and may contain errors related to that system including errors in grammar, punctuation, and spelling, as well as words and phrases that may be inappropriate. If there are any questions or concerns please feel free to contact the dictating provider for clarification.      Surekha Wilson MD  [facility] Paolo Naranjo MD  06/04/22 9692

## 2022-06-04 NOTE — PROGRESS NOTES
Behavioral Services  Medicare Certification Upon Admission    I certify that this patient's inpatient psychiatric hospital admission is medically necessary for:    [x] (1) Treatment which could reasonably be expected to improve this patient's condition,       [x] (2) Or for diagnostic study;     AND     [x](2) The inpatient psychiatric services are provided while the individual is under the care of a physician and are included in the individualized plan of care.     Estimated length of stay/service 2-5 days    Plan for post-hospital care outpatient services      Electronically signed by REN Thompson CNP on 6/4/2022 at 10:58 AM
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
## 2022-06-04 NOTE — BH NOTE
Patient arrived to [facility] AN AFFILIATE OF [facility] and changed into safety gown.

## 2022-06-04 NOTE — BH NOTE
[facility]  Admission Note     Admission Type:   Admission Type: Voluntary    Reason for admission:  Reason for Admission: Suicidal Ideation    PATIENT STRENGTHS:       Patient Strengths and Limitations:       Addictive Behavior:   Addictive Behavior  In the Past 3 Months, Have You Felt or Has Someone Told You That You Have a Problem With  : None    Medical Problems:   Past Medical History:   Diagnosis Date    Arthritis     Depression     Thyroid disease        Status EXAM:  Mental Status and Behavioral Exam  Normal: No  Level of Assistance: Independent/Self  Facial Expression: Avoids Gaze,Worried  Affect: Constricted  Level of Consciousness: Alert  Frequency of Checks: 4 times per hour, close  Mood:Normal: No  Mood: Depressed,Empty,Despair,Helpless,Sad  Motor Activity:Normal: Yes  Eye Contact: Poor  Observed Behavior: Cooperative,Guarded  Sexual Misconduct History: Current - no  Preception: [place] to person,[place] to time,[place] to place  Attention:Normal: No  Attention: Distractible  Thought Processes: Perseveration  Thought Content:Normal: Yes  Depression Symptoms: Feelings of hopelessess,Feelings of helplessness  Anxiety Symptoms: No problems reported or observed.   Cherri Symptoms: Poor judgment,Labile  Hallucinations: None  Delusions: No  Memory:Normal: Yes  Insight and Judgment: No  Insight and Judgment: Poor judgment,Poor insight    Tobacco Screening:  Practical Counseling, on admission, lucreita X, if applicable and completed (first 3 are required if patient doesn't refuse):            ( )  Recognizing danger situations (included triggers and roadblocks)                    ( )  Coping skills (new ways to manage stress, exercise, relaxation techniques, changing routine, distraction)                                                           ( )  Basic information about quitting (benefits of quitting, techniques in how to quit, available resources  ( ) [facility]                                           ( ) Patient refused counseling  (X) Patient has not smoked in the last 30 days    Metabolic Screening:    No results found for: LABA1C    No results found for: CHOL  No results found for: TRIG  No results found for: HDL  No components found for: LDLCAL  No results found for: LABVLDL      Body mass index is 27.12 kg/m². BP Readings from Last 2 Encounters:   06/04/22 122/72   10/04/21 117/65           Pt admitted with followings belongings:  Dental Appliances: None,Partials,Lowers  Vision - Corrective Lenses: None  Hearing Aid: None  Jewelry: Ring,Earrings (Wedding ring, two rings, necklace, and earrings)  Body Piercings Removed: N/A  Other Valuables: Money,Purse,Wallet,Keys,Personal Toiletries,Cigarettes,Lighter/Matches (cell phone, 10 credit cards, $88 cash to safe)     Patient's home medications were Patient brought in home medicatios x2. Patient oriented to surroundings and program expectations and copy of patient rights given Yes. Received admission packet: Yes. Consents reviewed and signed  Yes. Patient verbalize understanding: Yes. .  Patient education on precautions: Yes.                    Karolyn Sherwood RN

## 2022-06-04 NOTE — H&P
tobacco.  ETOH:   reports current alcohol use. Family History:   Positive as follows:    History reviewed. No pertinent family history. REVIEW OF SYSTEMS:       Constitutional: Negative for fever   HENT: Negative for sore throat   Respiratory: Negative  for dyspnea, cough   Cardiovascular: Negative for chest pain   Gastrointestinal: Negative for vomiting, diarrhea   Genitourinary: Negative for dysuria  Musculoskeletal: Negative for arthralgias   Skin: Negative for rash   Neurological: Negative for syncope   Psychiatric/Behavorial: per psychiatric evaluation    PHYSICAL EXAM:    /66   Pulse 90   Temp 98.4 °F (36.9 °C) (Oral)   Resp 16   Ht 5' 4\" (1.626 m)   Wt 158 lb (71.7 kg)   SpO2 98%   Breastfeeding No   BMI 27.12 kg/m²     Gen: No distress. Alert. Eyes: PERRL. No sclera icterus. No conjunctival injection. ENT: No discharge. Pharynx clear. Neck: No JVD. No Carotid Bruit. Trachea midline. Resp: No accessory muscle use. No crackles. No wheezes. No rhonchi. CV: Regular rate. Regular rhythm. No murmur. No rub. No edema. GI: Non-tender. Non-distended. Normal bowel sounds. Skin: Warm and dry. No nodule on exposed extremities. No rash on exposed extremities. M/S: No cyanosis. No joint deformity. No clubbing. Neuro: Awake. No focal neurologic deficit on exam.  Cranial nerves II through XII intact. Patient is able to ambulate without difficulty.   Psych: Per psychiatry team evaluation       CBC:   Recent Labs     06/04/22  0206   WBC 6.2   HGB 13.4   HCT 38.8   MCV 95.4        BMP:   Recent Labs     06/04/22  0206      K 3.6      CO2 21   BUN 8   CREATININE 0.8     LIVER PROFILE:   Recent Labs     06/04/22  0206   AST 15   ALT 18   BILITOT 0.3   ALKPHOS 84     UA:  Recent Labs     06/04/22  0125   PHUR 6.0       CULTURES  COVID/Influenza: not detected      ASSESSMENT/PLAN:    Hypothyroidism  - home dose of synthroid 100 mcg     Depression  -management per psychiatry    Tobacco Dependence  -Recommended cessation  - nicotine lozenge ordered     Arthritis  -continue Meloxicam    Pt has no medical complaints at this time. They were informed that should a medical concern arise during their admission they may have BHI contact us.     Isreal Andrews [facility]  6/4/2022 8:49 AM
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
## 2022-06-04 NOTE — BH NOTE
Level of Care Disposition: Admit      Patient was seen by ED provider and [facility] staff. The case presented to psychiatric provider on-call Griffin ROMANO. Based on the ED evaluation and information presented to the provider by Ewa Sexton RN it is the recommendation of the on call psychiatric provider that inpatient hospitalization is the least restrictive environment for the patient at this time. The patient will be admitted to the inpatient unit. Admitting provider did not order suicide precautions based on patient contracts for safety.         Insurance Pre certification Authorization: [id number]

## 2022-06-04 NOTE — BH NOTE
Patient brought in her home medication of Dextrcamp-Amphetamines 20 mg tablets locked in medication safe in medication room.

## 2022-06-04 NOTE — BH NOTE
Patient arrived on the milieu via a wheelchair @ 04:45. Patient alert and oriebted x 3. Patient denies being suicidal and contracts for safety with this writer. Patient signed admission paperwork.
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
## 2022-06-04 NOTE — BH NOTE
Level of Care Disposition: admit      Patient was seen by ED provider and [facility] AN [facility]FFILIATE OF [facility] staff. The case presented to psychiatric provider on-call Darwin ROMANO. Based on the ED evaluation and information presented to the provider by Claire Tolentino RN it is the recommendation of the on call psychiatric provider that inpatient hospitalization is the least restrictive environment for the patient at this time. The patient will be admitted to the inpatient unit. Admitting provider did not order suicide precautions based on patient contracts for safety.           Insurance Pre certification Authorization: [id number]

## 2022-06-05 LAB
CHOLESTEROL, TOTAL: 165 MG/DL (ref 0–199)
ESTIMATED AVERAGE GLUCOSE: 96.8 MG/DL
HBA1C MFR BLD: 5 %
HDLC SERPL-MCNC: 45 MG/DL (ref 40–60)
LDL CHOLESTEROL CALCULATED: 89 MG/DL
TRIGL SERPL-MCNC: 156 MG/DL (ref 0–150)
VLDLC SERPL CALC-MCNC: 31 MG/DL

## 2022-06-05 PROCEDURE — 1240000000 HC EMOTIONAL WELLNESS R&B

## 2022-06-05 PROCEDURE — 6370000000 HC RX 637 (ALT 250 FOR IP)

## 2022-06-05 RX ADMIN — MELOXICAM 15 MG: 15 TABLET ORAL at 08:56

## 2022-06-05 RX ADMIN — HYDROXYZINE HYDROCHLORIDE 50 MG: 50 TABLET, FILM COATED ORAL at 20:57

## 2022-06-05 RX ADMIN — DEXTROAMPHETAMINE SACCHARATE, AMPHETAMINE ASPARTATE, DEXTROAMPHETAMINE SULFATE AND AMPHETAMINE SULFATE 20 MG: 2.5; 2.5; 2.5; 2.5 TABLET ORAL at 08:56

## 2022-06-05 RX ADMIN — TRAZODONE HYDROCHLORIDE 100 MG: 100 TABLET ORAL at 20:57

## 2022-06-05 RX ADMIN — LEVOTHYROXINE SODIUM 100 MCG: 100 TABLET ORAL at 07:35

## 2022-06-05 RX ADMIN — BUPROPION HYDROCHLORIDE 300 MG: 300 TABLET, FILM COATED, EXTENDED RELEASE ORAL at 08:56

## 2022-06-05 ASSESSMENT — PAIN DESCRIPTION - ORIENTATION: ORIENTATION: RIGHT

## 2022-06-05 ASSESSMENT — PAIN DESCRIPTION - LOCATION: LOCATION: FOOT

## 2022-06-05 ASSESSMENT — PAIN SCALES - GENERAL: PAINLEVEL_OUTOF10: 6

## 2022-06-05 ASSESSMENT — PAIN DESCRIPTION - DESCRIPTORS: DESCRIPTORS: ACHING

## 2022-06-05 NOTE — PLAN OF CARE
Problem: Coping  Goal: Pt/Family able to verbalize concerns and demonstrate effective coping strategies  Description: INTERVENTIONS:  1. Assist patient/family to identify coping skills, available support systems and cultural and spiritual values  2. Provide emotional support, including active listening and acknowledgement of concerns of patient and caregivers  3. Reduce environmental stimuli, as able  4. Instruct patient/family in relaxation techniques, as appropriate  5. Assess for spiritual pain/suffering and initiate Spiritual Care, Psychosocial Clinical Specialist consults as needed  Outcome: Progressing     Problem: Self Harm/Suicidality  Goal: Will have no self-injury during hospital stay  Description: INTERVENTIONS:  1. Q 30 MINUTES: Routine safety checks  2. Q SHIFT & PRN: Assess risk to determine if routine checks are adequate to maintain patient safety  Outcome: Progressing     Pt isolative to room, slept most of shift. Received Atarax for anxiety. Pt monitored for safety and comfort.

## 2022-06-05 NOTE — PLAN OF CARE
Problem: Pain  Goal: Verbalizes/displays adequate comfort level or baseline comfort level  Outcome: Progressing     Problem: Coping  Goal: Pt/Family able to verbalize concerns and demonstrate effective coping strategies  Description: INTERVENTIONS:  1. Assist patient/family to identify coping skills, available support systems and cultural and spiritual values  2. Provide emotional support, including active listening and acknowledgement of concerns of patient and caregivers  3. Reduce environmental stimuli, as able  4. Instruct patient/family in relaxation techniques, as appropriate  5. Assess for spiritual pain/suffering and initiate Spiritual Care, Psychosocial Clinical Specialist consults as needed  6/5/2022 1535 by Anastasiya Mayen RN  Outcome: Progressing  Flowsheets (Taken 6/5/2022 1529)  Patient/family able to verbalize anxieties, fears, and concerns, and demonstrate effective coping:   Assist patient/family to identify coping skills, available support systems and cultural and spiritual values   Provide emotional support, including active listening and acknowledgement of concerns of patient and caregivers   Reduce environmental stimuli, as able   Instruct patient/family in relaxation techniques, as appropriate   Assess for spiritual pain/suffering and initiate Spiritual Care, Psychosocial Clinical Specialist consults as needed  6/5/2022 0534 by Robert Rojo RN  Outcome: Progressing     Problem: Self Harm/Suicidality  Goal: Will have no self-injury during hospital stay  Description: INTERVENTIONS:  1. Q 30 MINUTES: Routine safety checks  2. Q SHIFT & PRN: Assess risk to determine if routine checks are adequate to maintain patient safety  6/5/2022 1535 by Anastasiya Mayen RN  Outcome: Progressing  6/5/2022 0534 by Robert Rojo RN  Outcome: Progressing   Pt A&Ox4, denies desire to self or other harm. Appetite good. Visible on unit at times.  visited.

## 2022-06-05 NOTE — BH NOTE
[facility]  Treatment Team Note  Review Date & Time: 6/4/22 1055    Patient was not in treatment team      Status EXAM:   Mental Status and Behavioral Exam  Normal: No  Level of Assistance: Independent/Self  Facial Expression: Flat  Affect: Appropriate  Level of Consciousness: Alert  Frequency of Checks: 4 times per hour, close  Mood:Normal: No  Mood: Anxious,Depressed,Sad  Motor Activity:Normal: No  Motor Activity: Decreased  Eye Contact: Fair  Observed Behavior: Cooperative  Sexual Misconduct History: Current - no  Preception: [place] to person,[place] to time,[place] to place  Attention:Normal: No  Attention: Distractible  Thought Processes:  (linear)  Thought Content:Normal: Yes  Depression Symptoms: Isolative,Loss of interest  Anxiety Symptoms: Generalized  Cherri Symptoms: No problems reported or observed. Hallucinations: None  Delusions: No  Memory:Normal: No  Memory: Poor recent,Poor remote  Insight and Judgment: No  Insight and Judgment: Poor judgment,Poor insight      Suicide Risk CSSR-S:  1) Within the past month, have you wished you were dead or wished you could go to sleep and not wake up? : Yes  2) Have you actually had any thoughts of killing yourself? : No  3) Have you been thinking about how you might kill yourself? : No  5) Have you started to work out or worked out the details of how to kill yourself? Do you intend to carry out this plan? : No  6) Have you ever done anything, started to do anything, or prepared to do anything to end your life?: No      PLAN/TREATMENT RECOMMENDATIONS UPDATE: Patient will take medication as prescribed, eat 75% of meals, attend groups, participate in milieu activities, participate in treatment team and care planning for discharge and follow up.           Kipp Nissen, RN

## 2022-06-06 VITALS
WEIGHT: 158 LBS | HEIGHT: 64 IN | RESPIRATION RATE: 16 BRPM | SYSTOLIC BLOOD PRESSURE: 100 MMHG | BODY MASS INDEX: 26.98 KG/M2 | HEART RATE: 81 BPM | DIASTOLIC BLOOD PRESSURE: 67 MMHG | OXYGEN SATURATION: 96 % | TEMPERATURE: 97.5 F

## 2022-06-06 PROBLEM — F33.0 MILD EPISODE OF RECURRENT MAJOR DEPRESSIVE DISORDER (HCC): Status: ACTIVE | Noted: 2022-06-04

## 2022-06-06 PROBLEM — F90.0 ADHD (ATTENTION DEFICIT HYPERACTIVITY DISORDER), INATTENTIVE TYPE: Status: ACTIVE | Noted: 2022-06-06

## 2022-06-06 PROCEDURE — 99239 HOSP IP/OBS DSCHRG MGMT >30: CPT | Performed by: PSYCHIATRY & NEUROLOGY

## 2022-06-06 PROCEDURE — 5130000000 HC BRIDGE APPOINTMENT

## 2022-06-06 PROCEDURE — 6370000000 HC RX 637 (ALT 250 FOR IP)

## 2022-06-06 RX ORDER — DEXTROAMPHETAMINE SACCHARATE, AMPHETAMINE ASPARTATE, DEXTROAMPHETAMINE SULFATE AND AMPHETAMINE SULFATE 2.5; 2.5; 2.5; 2.5 MG/1; MG/1; MG/1; MG/1
20 TABLET ORAL DAILY
Qty: 14 TABLET | Refills: 0
Start: 2022-06-07 | End: 2022-06-14

## 2022-06-06 RX ORDER — BUPROPION HYDROCHLORIDE 300 MG/1
300 TABLET ORAL DAILY
Qty: 30 TABLET | Refills: 0 | Status: SHIPPED | OUTPATIENT
Start: 2022-06-07

## 2022-06-06 RX ORDER — DEXTROAMPHETAMINE SACCHARATE, AMPHETAMINE ASPARTATE, DEXTROAMPHETAMINE SULFATE AND AMPHETAMINE SULFATE 2.5; 2.5; 2.5; 2.5 MG/1; MG/1; MG/1; MG/1
20 TABLET ORAL DAILY
Status: DISCONTINUED | OUTPATIENT
Start: 2022-06-07 | End: 2022-06-06 | Stop reason: HOSPADM

## 2022-06-06 RX ADMIN — MELOXICAM 15 MG: 15 TABLET ORAL at 08:29

## 2022-06-06 RX ADMIN — BUPROPION HYDROCHLORIDE 300 MG: 300 TABLET, FILM COATED, EXTENDED RELEASE ORAL at 08:30

## 2022-06-06 RX ADMIN — LEVOTHYROXINE SODIUM 100 MCG: 100 TABLET ORAL at 06:13

## 2022-06-06 RX ADMIN — DEXTROAMPHETAMINE SACCHARATE, AMPHETAMINE ASPARTATE, DEXTROAMPHETAMINE SULFATE AND AMPHETAMINE SULFATE 20 MG: 2.5; 2.5; 2.5; 2.5 TABLET ORAL at 08:28

## 2022-06-06 ASSESSMENT — PAIN SCALES - GENERAL
PAINLEVEL_OUTOF10: 0
PAINLEVEL_OUTOF10: 0

## 2022-06-06 NOTE — FLOWSHEET NOTE
Group Therapy Note    Date: 6/6/2022  Start Time: 1300  End Time:  1330  Number of Participants: 6    Type of Group: Music Group    Notes:  Pt present for part of Music Group. While present, Pt sat quietly and listened. Pt left after 15 minutes in group. Participation Level:  Active Listener    Participation Quality: Appropriate and Attentive      Speech:  hesitant      Affective Functioning: Congruent      Endings: None Reported    Modes of Intervention: Support, Socialization, Activity and Media      Discipline Responsible: Chaplain Kristen Saldana       06/06/22 1343   Encounter Summary   Encounter Overview/Reason  Behavioral Health   Service Provided For: Patient   Last Encounter    (6/6 Music Group)   Complexity of Encounter Moderate   Begin Time 1300   End Time  1315   Total Time Calculated 15 min

## 2022-06-06 NOTE — BH NOTE
Purposeful Rounding     Patient Location: Patient room     Patient willing to engage in conversation:  Yes     Presentation/behavior: Cooperative     Affect: Brightens with interaction     Concerns reported: None     PRN medications given: N/A     Environmental assessment: No safety hazards noted     Fall prevention interventions in place: Lighting appropriate, Room free of clutter and Clear path to bathroom     Daily Cornell Fall Risk Score: 15

## 2022-06-06 NOTE — PLAN OF CARE
Problem: Pain  Goal: Verbalizes/displays adequate comfort level or baseline comfort level  6/5/2022 2332 by Scottie Morales RN  Outcome: Progressing     Problem: Coping  Goal: Pt/Family able to verbalize concerns and demonstrate effective coping strategies  Description: INTERVENTIONS:  1. Assist patient/family to identify coping skills, available support systems and cultural and spiritual values  2. Provide emotional support, including active listening and acknowledgement of concerns of patient and caregivers  3. Reduce environmental stimuli, as able  4. Instruct patient/family in relaxation techniques, as appropriate  5. Assess for spiritual pain/suffering and initiate Spiritual Care, Psychosocial Clinical Specialist consults as needed  6/5/2022 2332 by Scottie Morales RN  Outcome: Progressing     Problem: Self Harm/Suicidality  Goal: Will have no self-injury during hospital stay  Description: INTERVENTIONS:  1. Q 30 MINUTES: Routine safety checks  2. Q SHIFT & PRN: Assess risk to determine if routine checks are adequate to maintain patient safety  6/5/2022 2332 by Scottie Morales RN  Outcome: Progressing     Pt denies all SI/AVH and states that she would like to go home tomorrow. +meds,+groups. Calm and cooperative.

## 2022-06-06 NOTE — GROUP NOTE
Group Therapy Note    Date: 6/6/2022    Group Start Time: 1100  Group End Time: 3159  Group Topic: Recreational    [id number] [facility]        Group Therapy Note    Attendees: 8    Group members engaged in an ice breaker of \"Two Truths and a Lie\" and then broke into groups and played games of 3200 . Notes:  Mary Gannon attended group for the full duration. Mary Gannon remained engaged and interacted appropriately with other members of the group. Status After Intervention:  Improved    Participation Level:  Active Listener and Interactive    Participation Quality: Appropriate, Attentive and Sharing      Speech:  normal      Thought Process/Content: Logical      Affective Functioning: Congruent      Mood: euthymic      Level of consciousness:  Alert      Response to Learning: Able to verbalize current knowledge/experience      Endings: None Reported    Modes of Intervention: Socialization, Exploration and Activity      Discipline Responsible: Behavorial Health Tech      Signature:  ROLAND Martinez

## 2022-06-06 NOTE — PLAN OF CARE
Problem: Pain  Goal: Verbalizes/displays adequate comfort level or baseline comfort level  6/6/2022 1130 by Celio Mao RN  Outcome: Progressing     Problem: Coping  Goal: Pt/Family able to verbalize concerns and demonstrate effective coping strategies  Description: INTERVENTIONS:  1. Assist patient/family to identify coping skills, available support systems and cultural and spiritual values  2. Provide emotional support, including active listening and acknowledgement of concerns of patient and caregivers  3. Reduce environmental stimuli, as able  4. Instruct patient/family in relaxation techniques, as appropriate  5. Assess for spiritual pain/suffering and initiate Spiritual Care, Psychosocial Clinical Specialist consults as needed  6/6/2022 1130 by Celio Mao RN  Outcome: Progressing     Problem: Self Harm/Suicidality  Goal: Will have no self-injury during hospital stay  Description: INTERVENTIONS:  1. Q 30 MINUTES: Routine safety checks  2. Q SHIFT & PRN: Assess risk to determine if routine checks are adequate to maintain patient safety  6/6/2022 1130 by Celio Mao RN  Outcome: Progressing     Problem: ABCDS Injury Assessment  Goal: Absence of physical injury  Outcome: Progressing    Patient is interactive with staff. Patient denies SI/HI/AVH. Patient states they slept good last night. She reports feeling \"alright - I'm ready to go home. \" Patient compliant with medications. Patient is calm and cooperative.

## 2022-06-06 NOTE — DISCHARGE SUMMARY
Discharge Summary    Admit Date: 6/4/2022   Discharge Date:  6/6/2022 6/6/2022      Spent over 40 minutes with patient and staff on [facility]     Final Dx:  Mild episode of recurrent major depressive disorder ([facility] [address]       Present on Admission:   Mild episode of recurrent major depressive disorder ([address]   (Resolved) Suicide ideation   (Resolved) Encounter for routine history and physical examination of adult   Hypothyroidism (acquired)   Osteoarthritis of multiple joints   ADHD (attention deficit hyperactivity disorder), inattentive type     At Discharge: Active Hospital Problems    Diagnosis Date Noted    ADHD (attention deficit hyperactivity disorder), inattentive type [F90.0] 06/06/2022     Priority: Medium    Mild episode of recurrent major depressive disorder ([facility] [address] [F33.0] 06/04/2022     Priority: Medium    Hypothyroidism (acquired) [E03.9]      Priority: Medium    Osteoarthritis of multiple joints [M15.9]      Priority: Medium       Condition on DC  Mood and affect are stable and pt is not suicidal     VITALS:  /67   Pulse 81   Temp 97.5 °F (36.4 °C) (Temporal)   Resp 16   Ht 5' 4\" (1.626 m)   Wt 158 lb (71.7 kg)   SpO2 96%   Breastfeeding No   BMI 27.12 kg/m²     Brief Summary Present Illness       HPI:   Patient seen in room on Adult Behavioral Unit. Patient is a 40 y.o. female who presents  [facility] ED voluntarily after driving herself to the ED for suicidal ideations. Patient reports she had an overdose attempt 10 years ago and was hospitalized at [facility] and did not want to do that again so she came in for help.      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 Pt states that she does not have the energy to get up and live her life anymore. Pt states she and her  built their dream home and she has no energy to enjoy it.   Pt feels hopeless, is tearful, having trouble sleeping, has not motivation, and feels no lovely in life anymore. Pt states she just wants to fall asleep and not wake up.       Pt is alert and oriented at this time. Pt has a previous suicide attempt 10 years ago, overdose, and was seen at [facility]. Pt lives with her  of 5 years, claims he is verbally abusive. 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 Pt denies trauma or abuse history. Pt smokes 1ppd, drinks alcohol a few times a week, denies drug use. Pt contracts for safety at this time.         PAST PSYCHIATRIC HISTORY:  MDD     FAMILY PSYCHIATRIC HISTORY:     Family History   History reviewed. No pertinent family history.        ALLERGIES:  No Known Allergies     CURRENT MEDICATIONS:    Scheduled Medications    levothyroxine  100 mcg Oral Daily    traZODone  100 mg Oral Nightly    [START ON 6/5/2022] buPROPion  300 mg Oral Daily            PAST MEDICAL HISTORY:    Past Medical History        Past Medical History:   Diagnosis Date    Arthritis      Depression      Thyroid disease              PAST SURGICAL HISTORY:    Past Surgical History   Past Surgical History:   Procedure Laterality Date    COLONOSCOPY        ENDOMETRIAL ABLATION        ENDOSCOPY, COLON, DIAGNOSTIC        EYE SURGERY                   SOCIAL HISTORY:  Social History            Hospital Course       Patient was admitted and her medications were resumed. Wellbutrin dose increased to 300 mg per day. Over the course of the hospitalization, she began going to groups, socializing with peers and stating she wanted to go home. On the day of discharge, she said she feels \"much better\" and says she felt poorly at admission because of being overwhelmed with stress about her home building and marriage troubles. She feels that being in the hospital has been beneficial.  She is agreeable with continuing the increased dose of Wellbutrin and asks for outpatient referrals for primary care and psychiatry.     At discharge, her depressive symptoms were improved. Suicidal and homicidal ideation were absent. PE: (reviewed) and labs (see medical H&PE)    Labs:    Admission on 06/04/2022, Discharged on 06/06/2022   Component Date Value Ref Range Status    WBC 06/04/2022 6.2  4.0 - 11.0 K/uL Final    RBC 06/04/2022 4.07  4.00 - 5.20 M/uL Final    Hemoglobin 06/04/2022 13.4  12.0 - 16.0 g/dL Final    Hematocrit 06/04/2022 38.8  36.0 - 48.0 % Final    MCV 06/04/2022 95.4  80.0 - 100.0 fL Final    MCH 06/04/2022 33.0  26.0 - 34.0 pg Final    MCHC 06/04/2022 34.5  31.0 - 36.0 g/dL Final    RDW 06/04/2022 12.3* 12.4 - 15.4 % Final    Platelets 42/87/1163 238  135 - 450 K/uL Final    MPV 06/04/2022 7.9  5.0 - 10.5 fL Final    Neutrophils % 06/04/2022 63.5  % Final    Lymphocytes % 06/04/2022 24.2  % Final    Monocytes % 06/04/2022 10.1  % Final    Eosinophils % 06/04/2022 1.1  % Final    Basophils % 06/04/2022 1.1  % Final    Neutrophils Absolute 06/04/2022 3.9  1.7 - 7.7 K/uL Final    Lymphocytes Absolute 06/04/2022 1.5  1.0 - 5.1 K/uL Final    Monocytes Absolute 06/04/2022 0.6  0.0 - 1.3 K/uL Final    Eosinophils Absolute 06/04/2022 0.1  0.0 - 0.6 K/uL Final    Basophils Absolute 06/04/2022 0.1  0.0 - 0.2 K/uL Final    Sodium 06/04/2022 140  136 - 145 mmol/L Final    Potassium 06/04/2022 3.6  3.5 - 5.1 mmol/L Final    Chloride 06/04/2022 106  99 - 110 mmol/L Final    CO2 06/04/2022 21  21 - 32 mmol/L Final    Anion Gap 06/04/2022 13  3 - 16 Final    Glucose 06/04/2022 84  70 - 99 mg/dL Final    BUN 06/04/2022 8  7 - 20 mg/dL Final    CREATININE 06/04/2022 0.8  0.6 - 1.1 mg/dL Final    GFR Non- 06/04/2022 >60  >60 Final    Comment: >60 mL/min/1.73m2 EGFR, calc. for ages 25 and older using the  MDRD formula (not corrected for weight), is valid for stable  renal function.       GFR  06/04/2022 >60  >60 Final    Comment: Chronic Kidney Disease: less than 60 ml/min/1.73 sq.m.          Kidney Failure: less than 15 ml/min/1.73 sq.m. Results valid for patients 18 years and older.  Calcium 06/04/2022 8.8  8.3 - 10.6 mg/dL Final    Total Protein 06/04/2022 6.8  6.4 - 8.2 g/dL Final    Albumin 06/04/2022 4.4  3.4 - 5.0 g/dL Final    Albumin/Globulin Ratio 06/04/2022 1.8  1.1 - 2.2 Final    Total Bilirubin 06/04/2022 0.3  0.0 - 1.0 mg/dL Final    Alkaline Phosphatase 06/04/2022 84  40 - 129 U/L Final    ALT 06/04/2022 18  10 - 40 U/L Final    AST 06/04/2022 15  15 - 37 U/L Final    Ethanol Lvl 06/04/2022 63  mg/dL Final    Comment:    None Detected  Conversion factor:  100 mg/dl = .100 g/dl  For Medical Purposes Only      Acetaminophen Level 06/04/2022 <5* 10 - 30 ug/mL Final    Comment: Therapeutic Range: 10.0-30.0 ug/mL  Toxic: >=470 ug/mL      Salicylate, Serum 40/77/1805 <0.3* 15.0 - 30.0 mg/dL Final    Comment: Therapeutic Range: 15.0-30.0 mg/dL  Toxic: >30.0 mg/dL      Amphetamine Screen, Urine 06/04/2022 POSITIVE* Negative <1000ng/mL Final    Comment: High concentrations of ephedrine/pseudoephedrine or  phenylpropanolamine may cause false positive results  for amphetamine. Therefore, confirmatory testing for  amphetamine should be considered if clinically indicated.  Barbiturate Screen, Ur 06/04/2022 Neg  Negative <200 ng/mL Final    Benzodiazepine Screen, Urine 06/04/2022 Neg  Negative <200 ng/mL Final    Cannabinoid Scrn, Ur 06/04/2022 Neg  Negative <50 ng/mL Final    Cocaine Metabolite Screen, Urine 06/04/2022 Neg  Negative <300 ng/mL Final    Opiate Scrn, Ur 06/04/2022 Neg  Negative <300 ng/mL Final    Comment: \"Therapeutic levels of pain medication, especially oxycontin and synthetic  opioids, may not be detected by this Methodology. Pain management screen  panel  Drug panel-PM-Hi Res Ur, Interp (PAIN) should be considered for drug  monitoring \".       PCP Screen, Urine 06/04/2022 Neg  Negative <25 ng/mL Final    Methadone Screen, Urine 06/04/2022 Neg  Negative <300 ng/mL Final    Propoxyphene Scrn, Ur 06/04/2022 Neg  Negative <300 ng/mL Final    Oxycodone Urine 06/04/2022 Neg  Negative <100 ng/ml Final    pH, UA 06/04/2022 6.0   Final    Comment: Urine pH less than 5.0 or greater than 8.0 may indicate sample adulteration. Another sample should be collected if clinically  indicated.  Drug Screen Comment: 06/04/2022 see below   Final    Comment: This method is a screening test to detect only these drug  classes as part of a medical workup. Confirmatory testing  by another method should be ordered if clinically indicated.  SARS-CoV-2 RNA, RT PCR 06/04/2022 NOT DETECTED  NOT DETECTED Final    Comment: Not Detected results do not preclude SARS-CoV-2 infection and  should not be used as the sole basis for patient management  decisions. Results must be combined with clinical observations,  patient history, and epidemiological information. Testing was performed using TERRELL VINH SARS-CoV-2 and Influenza A/B  nucleic acid assay. This test is a multiplex Real-Time Reverse  Transcriptase Polymerase Chain Reaction (RT-PCR)-based in vitro  diagnostic test intended for the qualitative detection of nucleic  acids from SARS-CoV-2, influenza A, and influenza B in nasopharyngeal  and nasal swab specimens for use under the FDAs Emergency Use  Authorization (EUA) only.     Patient Fact Sheet:  FindDrives.pl  Provider Fact Sheet: FindDrives.pl  EUA: FindDrives.pl  IFU: FindDrives.pl    Methodology:  RT-PCR      INFLUENZA A 06/04/2022 NOT DETECTED  NOT DETECTED Final    INFLUENZA B 06/04/2022 NOT DETECTED  NOT DETECTED Final    TSH 06/04/2022 2.31  0.27 - 4.20 uIU/mL Final    Cholesterol, Total 06/04/2022 165  0 - 199 mg/dL Final    Triglycerides 06/04/2022 156* 0 - 150 mg/dL Final    HDL 06/04/2022 45  40 - 60 mg/dL Final    LDL Calculated 06/04/2022 89  <100 mg/dL Final    VLDL Cholesterol Calculated 06/04/2022 31  Not Established mg/dL Final    Hemoglobin A1C 06/04/2022 5.0  See comment % Final    Comment: Comment:  Diagnosis of Diabetes: > or = 6.5%  Increased risk of diabetes (Prediabetes): 5.7-6.4%  Glycemic Control: Nonpregnant Adults: <7.0%                    Pregnant: <6.0%        eAG 06/04/2022 96.8  mg/dL Final          Mental Status Exam at Discharge:  Level of consciousness:  awake  Appearance:  well-appearing, in chair, good grooming and good hygiene well-developed, well-nourished  Behavior/Motor:  no abnormalities noted normal gait and station AIMS: 0  Attitude toward examiner:  cooperative, attentive and good eye contact  Speech:  spontaneous, normal rate, normal volume and well articulated  Mood:  dysthymic  Affect:  mood congruent Anxiety: mild  Hallucinations: Absent  Thought processes:  coherent   Attention span, Concentration & Attention:  attention span and concentration were age appropriate  Thought content:  No evidence of delusions   Insight: Good insight and judgment   Cognition:  oriented to person, place, and time    Fund of Knowledge: average    IQ:average   Memory: intact    Suicide:  No plan to harm self  Homicide:  No plan to harm others  Sleep: sleeps through the night  Appetite: ok     Reassess Lucie Risk:  no plan to harm self Pt has phone numbers to contact if suicidal thoughts recur and states pt will return to the hospital if suicidal feelings return.      Hospital Routine Meds:     [START ON 6/7/2022] amphetamine-dextroamphetamine  20 mg Oral Daily    levothyroxine  100 mcg Oral Daily    traZODone  100 mg Oral Nightly    buPROPion  300 mg Oral Daily    meloxicam  15 mg Oral Daily        Hospital PRN Meds: acetaminophen, ibuprofen, magnesium hydroxide, nicotine polacrilex, aluminum & magnesium hydroxide-simethicone, hydrOXYzine HCl, OLANZapine **OR** OLANZapine (ZyPREXA) in sterile water IntraMUSCular, sterile water, diphenhydrAMINE     Discharge Meds:    Discharge Medication List as of 6/6/2022  2:18 PM           Details   buPROPion (WELLBUTRIN XL) 300 MG extended release tablet Take 1 tablet by mouth daily, Disp-30 tablet, R-0Normal      amphetamine-dextroamphetamine (ADDERALL) 10 MG tablet Take 2 tablets by mouth daily for 7 days. , Disp-14 tablet, R-0NO PRINT              Details   hydrOXYzine HCl (ATARAX) 25 MG tablet Take 25 mg by mouth nightly For sleepHistorical Med      meloxicam (MOBIC) 15 MG tablet Take 15 mg by mouth dailyHistorical Med      traZODone (DESYREL) 100 MG tablet Take 100 mg by mouth nightlyHistorical Med      levothyroxine (SYNTHROID) 100 MCG tablet TAKE 1 TABLET BY MOUTH EVERY DAYHistorical Med                  Multiple Neuroleptics? Previous?  Clozaril -- N/A    Disposition - Residence  Follow Up:  See Discharge Instructions

## 2022-06-06 NOTE — PROGRESS NOTES
Shift assessment completed, scheduled meds given, PRN atarax given for generalized anxiety. Vss. Denies all, denies further needs at this time.

## 2022-06-06 NOTE — BH NOTE
Purposeful Rounding    Patient Location: Patient room    Patient willing to engage in conversation: Yes    Presentation/behavior: Cooperative    Affect: Brightens with interaction    Concerns reported: None    PRN medications given: N/A    Environmental assessment: No safety hazards noted    Fall prevention interventions in place: Lighting appropriate, Room free of clutter and Clear path to bathroom    Daily Cornell Fall Risk Score: 15      Electronically signed by Mamadou Bowser RN on 6/6/22 at 11:35 AM EDT

## 2022-06-06 NOTE — BH NOTE
[facility]  Discharge Note    Pt discharged with followings belongings:   Dental Appliances: None,Partials,Lowers  Vision - Corrective Lenses: None  Hearing Aid: None  Jewelry: Ring,Earrings (Wedding ring, two rings, necklace, and earrings)  Body Piercings Removed: N/A  Clothing: Undergarments,Pants,Shirt,Other (Comment) (book)  Other Valuables: Money,Purse,Wallet,Keys,Personal Toiletries,Cigarettes,Lighter/Matches (cell phone, 10 credit cards, $88 cash to safe)   Valuables sent home with patient or returned to patient. Patient education on aftercare instructions: yes. Information faxed to [facility] by this writer  at 2:22 PM .Patient verbalize understanding of AVS:  yes. Status EXAM upon discharge:  Mental Status and Behavioral Exam  Normal: No  Level of Assistance: Independent/Self  Facial Expression: Flat  Affect: Appropriate  Level of Consciousness: Alert  Frequency of Checks: 4 times per hour, close  Mood:Normal: Yes  Mood: Other (comment) (Alright - \"Ready to go home\")  Motor Activity:Normal: Yes  Motor Activity: Decreased  Eye Contact: Fair  Observed Behavior: Cooperative,Friendly  Sexual Misconduct History: Current - no  Preception: [place] to person,[place] to time,[place] to place,[place] to situation  Attention:Normal: No  Attention: Distractible  Thought Processes: Other (comment) (Linear)  Thought Content:Normal: Yes  Thought Content: Other (comment) (WNL)  Depression Symptoms: No problems reported or observed. Anxiety Symptoms: Generalized  Cherri Symptoms: No problems reported or observed.   Hallucinations: None  Delusions: No  Memory:Normal: Yes  Memory: Poor recent  Insight and Judgment: No  Insight and Judgment: Poor judgment,Poor insight      Metabolic Screening:    Lab Results   Component Value Date    LABA1C 5.0 06/04/2022       Lab Results   Component Value Date    CHOL 165 06/04/2022     Lab Results   Component Value Date    TRIG 156 (H) 06/04/2022     Lab Results   Component Value Date    HDL 45 06/04/2022     No components found for: [facility]  Lab Results   Component Value Date    LABVLDL 31 06/04/2022       Ksenia Naranjo RN    Bridge Appointment completed: Reviewed Discharge Instructions with patient. Patient verbalizes understanding and agreement with the discharge plan using the teachback method.      Referral for Outpatient Tobacco Cessation Counseling, upon discharge (lucretia X if applicable and completed):    ( )  Hospital staff assisted patient to call Quit Line or faxed referral                                   during hospitalization                  (X)  Recognizing danger situations (included triggers and roadblocks), if not completed on admission                    (X)  Coping skills (new ways to manage stress, exercise, relaxation techniques, changing routine, distraction), if not completed on admission   (X)  Basic information about quitting (benefits of quitting, techniques in how to quit, available resources, if not completed on admission  ( ) Referral for counseling faxed to [facility]   ( ) Patient refused referral  ( ) Patient refused counseling  ( ) Patient refused smoking cessation medication upon discharge    Vaccinations (lucretia X if applicable and completed):  ( ) Patient states already received influenza vaccine elsewhere  ( ) Patient received influenza vaccine during this hospitalization  ( ) Patient refused influenza vaccine at this time  (X) Not offered

## 2025-04-14 ENCOUNTER — HOSPITAL ENCOUNTER (EMERGENCY)
Age: 45
Discharge: HOME | End: 2025-04-14
Attending: EMERGENCY MEDICINE
Payer: COMMERCIAL

## 2025-04-14 VITALS
OXYGEN SATURATION: 100 % | TEMPERATURE: 97.9 F | DIASTOLIC BLOOD PRESSURE: 80 MMHG | HEART RATE: 90 BPM | SYSTOLIC BLOOD PRESSURE: 154 MMHG | RESPIRATION RATE: 18 BRPM

## 2025-04-14 DIAGNOSIS — S01.512A LACERATION OF TONGUE, INITIAL ENCOUNTER: Primary | ICD-10-CM

## 2025-04-14 PROCEDURE — 99283 EMERGENCY DEPT VISIT LOW MDM: CPT

## 2025-04-14 PROCEDURE — 6370000000 HC RX 637 (ALT 250 FOR IP): Performed by: EMERGENCY MEDICINE

## 2025-04-14 RX ORDER — LIDOCAINE HYDROCHLORIDE 20 MG/ML
15 SOLUTION OROPHARYNGEAL ONCE
Status: COMPLETED | OUTPATIENT
Start: 2025-04-14 | End: 2025-04-14

## 2025-04-14 RX ADMIN — LIDOCAINE HYDROCHLORIDE 15 ML: 20 SOLUTION ORAL at 23:22

## 2025-04-15 NOTE — ED PROVIDER NOTES
tongue, initial encounter          Disposition:  Discharge to home in good condition.    Blood pressure (!) 154/80, pulse 90, temperature 97.9 °F (36.6 °C), temperature source Oral, resp. rate 18, SpO2 100%, not currently breastfeeding.    Patient was given scripts for the following medications. I counseled patient how to take these medications.   New Prescriptions    MAGIC MOUTHWASH (MIRACLE MOUTHWASH)    Swish and spit 15 mLs 4 times daily as needed for Irritation or Dental Pain 1:1:1 ratio of diphenhydramine, nystatin, and lidocaine       Disposition referral (if applicable):  [facility], [facility]  [address]  [facility]  [phone]    Schedule an appointment as soon as possible for a visit in 3 days  For wound re-check      ISevero, am the primary attending of record and contributed the majority of evaluation and treatment of emergent care for this encounter.     Total critical care time is 0 minutes, which excludes separately billable procedures and updating family. Time spent is specifically for management of the presenting complaint and symptoms initially, direct bedside care, reevaluation, review of records, and consultation.  There was a high probability of clinically significant life-threatening deterioration in the patient's condition, which required my urgent intervention.     This chart was generated in part by using Dragon Dictation system and may contain errors related to that system including errors in grammar, punctuation, and spelling, as well as words and phrases that may be inappropriate. If there are any questions or concerns please feel free to contact the dictating provider for clarification.     Severo Hui MD   [facility]       Severo Hui MD  04/15/25 0000